# Patient Record
Sex: MALE | Race: WHITE | NOT HISPANIC OR LATINO
[De-identification: names, ages, dates, MRNs, and addresses within clinical notes are randomized per-mention and may not be internally consistent; named-entity substitution may affect disease eponyms.]

---

## 2018-06-20 ENCOUNTER — APPOINTMENT (OUTPATIENT)
Dept: ORTHOPEDIC SURGERY | Facility: CLINIC | Age: 73
End: 2018-06-20

## 2018-12-13 ENCOUNTER — APPOINTMENT (OUTPATIENT)
Dept: ORTHOPEDIC SURGERY | Facility: CLINIC | Age: 73
End: 2018-12-13
Payer: MEDICARE

## 2018-12-13 VITALS — DIASTOLIC BLOOD PRESSURE: 76 MMHG | WEIGHT: 170 LBS | HEART RATE: 72 BPM | SYSTOLIC BLOOD PRESSURE: 144 MMHG

## 2018-12-13 DIAGNOSIS — Z78.9 OTHER SPECIFIED HEALTH STATUS: ICD-10-CM

## 2018-12-13 DIAGNOSIS — Z87.39 PERSONAL HISTORY OF OTHER DISEASES OF THE MUSCULOSKELETAL SYSTEM AND CONNECTIVE TISSUE: ICD-10-CM

## 2018-12-13 PROCEDURE — 99204 OFFICE O/P NEW MOD 45 MIN: CPT

## 2018-12-13 PROCEDURE — 72170 X-RAY EXAM OF PELVIS: CPT

## 2018-12-13 PROCEDURE — 73562 X-RAY EXAM OF KNEE 3: CPT | Mod: 50

## 2018-12-18 ENCOUNTER — OTHER (OUTPATIENT)
Age: 73
End: 2018-12-18

## 2018-12-21 PROBLEM — Z78.9 DOES NOT USE ILLICIT DRUGS: Status: ACTIVE | Noted: 2018-12-13

## 2018-12-21 PROBLEM — Z78.9 RARELY CONSUMES ALCOHOL: Status: ACTIVE | Noted: 2018-12-13

## 2018-12-21 PROBLEM — Z87.39 HISTORY OF ARTHRITIS: Status: RESOLVED | Noted: 2018-12-13 | Resolved: 2018-12-21

## 2018-12-21 PROBLEM — Z78.9 EXERCISES OCCASIONALLY: Status: ACTIVE | Noted: 2018-12-13

## 2019-01-29 ENCOUNTER — OUTPATIENT (OUTPATIENT)
Dept: OUTPATIENT SERVICES | Facility: HOSPITAL | Age: 74
LOS: 1 days | End: 2019-01-29
Payer: MEDICARE

## 2019-01-29 ENCOUNTER — APPOINTMENT (OUTPATIENT)
Dept: MRI IMAGING | Facility: CLINIC | Age: 74
End: 2019-01-29
Payer: MEDICARE

## 2019-01-29 DIAGNOSIS — M17.0 BILATERAL PRIMARY OSTEOARTHRITIS OF KNEE: ICD-10-CM

## 2019-01-29 DIAGNOSIS — M25.562 PAIN IN LEFT KNEE: ICD-10-CM

## 2019-01-29 PROCEDURE — 73721 MRI JNT OF LWR EXTRE W/O DYE: CPT | Mod: 26,LT

## 2019-01-29 PROCEDURE — 73721 MRI JNT OF LWR EXTRE W/O DYE: CPT

## 2019-02-06 ENCOUNTER — APPOINTMENT (OUTPATIENT)
Dept: ORTHOPEDIC SURGERY | Facility: CLINIC | Age: 74
End: 2019-02-06

## 2019-04-09 ENCOUNTER — OUTPATIENT (OUTPATIENT)
Dept: OUTPATIENT SERVICES | Facility: HOSPITAL | Age: 74
LOS: 1 days | End: 2019-04-09
Payer: MEDICARE

## 2019-04-09 VITALS
TEMPERATURE: 97 F | HEART RATE: 72 BPM | SYSTOLIC BLOOD PRESSURE: 118 MMHG | HEIGHT: 66 IN | DIASTOLIC BLOOD PRESSURE: 80 MMHG | WEIGHT: 171.96 LBS | RESPIRATION RATE: 16 BRPM

## 2019-04-09 DIAGNOSIS — M19.90 UNSPECIFIED OSTEOARTHRITIS, UNSPECIFIED SITE: ICD-10-CM

## 2019-04-09 DIAGNOSIS — Z92.3 PERSONAL HISTORY OF IRRADIATION: Chronic | ICD-10-CM

## 2019-04-09 DIAGNOSIS — Z94.84 STEM CELLS TRANSPLANT STATUS: Chronic | ICD-10-CM

## 2019-04-09 DIAGNOSIS — M17.12 UNILATERAL PRIMARY OSTEOARTHRITIS, LEFT KNEE: ICD-10-CM

## 2019-04-09 DIAGNOSIS — Z98.49 CATARACT EXTRACTION STATUS, UNSPECIFIED EYE: Chronic | ICD-10-CM

## 2019-04-09 DIAGNOSIS — Z98.890 OTHER SPECIFIED POSTPROCEDURAL STATES: Chronic | ICD-10-CM

## 2019-04-09 LAB
ANION GAP SERPL CALC-SCNC: 11 MMO/L — SIGNIFICANT CHANGE UP (ref 7–14)
APPEARANCE UR: CLEAR — SIGNIFICANT CHANGE UP
BILIRUB UR-MCNC: NEGATIVE — SIGNIFICANT CHANGE UP
BLD GP AB SCN SERPL QL: NEGATIVE — SIGNIFICANT CHANGE UP
BLOOD UR QL VISUAL: NEGATIVE — SIGNIFICANT CHANGE UP
BUN SERPL-MCNC: 21 MG/DL — SIGNIFICANT CHANGE UP (ref 7–23)
CALCIUM SERPL-MCNC: 9.4 MG/DL — SIGNIFICANT CHANGE UP (ref 8.4–10.5)
CHLORIDE SERPL-SCNC: 106 MMOL/L — SIGNIFICANT CHANGE UP (ref 98–107)
CO2 SERPL-SCNC: 28 MMOL/L — SIGNIFICANT CHANGE UP (ref 22–31)
COLOR SPEC: SIGNIFICANT CHANGE UP
CREAT SERPL-MCNC: 1.3 MG/DL — SIGNIFICANT CHANGE UP (ref 0.5–1.3)
GLUCOSE SERPL-MCNC: 97 MG/DL — SIGNIFICANT CHANGE UP (ref 70–99)
GLUCOSE UR-MCNC: NEGATIVE — SIGNIFICANT CHANGE UP
HBA1C BLD-MCNC: 5.3 % — SIGNIFICANT CHANGE UP (ref 4–5.6)
HCT VFR BLD CALC: 45.9 % — SIGNIFICANT CHANGE UP (ref 39–50)
HGB BLD-MCNC: 15 G/DL — SIGNIFICANT CHANGE UP (ref 13–17)
KETONES UR-MCNC: NEGATIVE — SIGNIFICANT CHANGE UP
LEUKOCYTE ESTERASE UR-ACNC: NEGATIVE — SIGNIFICANT CHANGE UP
MCHC RBC-ENTMCNC: 28.5 PG — SIGNIFICANT CHANGE UP (ref 27–34)
MCHC RBC-ENTMCNC: 32.7 % — SIGNIFICANT CHANGE UP (ref 32–36)
MCV RBC AUTO: 87.1 FL — SIGNIFICANT CHANGE UP (ref 80–100)
NITRITE UR-MCNC: NEGATIVE — SIGNIFICANT CHANGE UP
NRBC # FLD: 0 K/UL — SIGNIFICANT CHANGE UP (ref 0–0)
PH UR: 6 — SIGNIFICANT CHANGE UP (ref 5–8)
PLATELET # BLD AUTO: 173 K/UL — SIGNIFICANT CHANGE UP (ref 150–400)
PMV BLD: 10 FL — SIGNIFICANT CHANGE UP (ref 7–13)
POTASSIUM SERPL-MCNC: 4.1 MMOL/L — SIGNIFICANT CHANGE UP (ref 3.5–5.3)
POTASSIUM SERPL-SCNC: 4.1 MMOL/L — SIGNIFICANT CHANGE UP (ref 3.5–5.3)
PROT UR-MCNC: NEGATIVE — SIGNIFICANT CHANGE UP
RBC # BLD: 5.27 M/UL — SIGNIFICANT CHANGE UP (ref 4.2–5.8)
RBC # FLD: 13 % — SIGNIFICANT CHANGE UP (ref 10.3–14.5)
RH IG SCN BLD-IMP: POSITIVE — SIGNIFICANT CHANGE UP
SODIUM SERPL-SCNC: 145 MMOL/L — SIGNIFICANT CHANGE UP (ref 135–145)
SP GR SPEC: 1.01 — SIGNIFICANT CHANGE UP (ref 1–1.04)
UROBILINOGEN FLD QL: NORMAL — SIGNIFICANT CHANGE UP
WBC # BLD: 4.99 K/UL — SIGNIFICANT CHANGE UP (ref 3.8–10.5)
WBC # FLD AUTO: 4.99 K/UL — SIGNIFICANT CHANGE UP (ref 3.8–10.5)

## 2019-04-09 PROCEDURE — 93010 ELECTROCARDIOGRAM REPORT: CPT

## 2019-04-09 RX ORDER — SODIUM CHLORIDE 9 MG/ML
3 INJECTION INTRAMUSCULAR; INTRAVENOUS; SUBCUTANEOUS EVERY 8 HOURS
Qty: 0 | Refills: 0 | Status: DISCONTINUED | OUTPATIENT
Start: 2019-04-29 | End: 2019-04-30

## 2019-04-09 NOTE — H&P PST ADULT - NEGATIVE CARDIOVASCULAR SYMPTOMS
no palpitations/no chest pain/no paroxysmal nocturnal dyspnea/no peripheral edema/no dyspnea on exertion

## 2019-04-09 NOTE — H&P PST ADULT - RS GEN PE MLT RESP DETAILS PC
airway patent/clear to auscultation bilaterally/breath sounds equal/good air movement/respirations non-labored/no chest wall tenderness

## 2019-04-09 NOTE — H&P PST ADULT - NEGATIVE NEUROLOGICAL SYMPTOMS
no loss of consciousness/no generalized seizures/no tremors/no loss of sensation/no vertigo/no headache/no confusion/no focal seizures/no hemiparesis/no syncope/no transient paralysis/no weakness/no paresthesias

## 2019-04-09 NOTE — H&P PST ADULT - NSICDXPROBLEM_GEN_ALL_CORE_FT
PROBLEM DIAGNOSES  Problem: OA (osteoarthritis)  Assessment and Plan: Scheduled for Left Total Knee Replacement on 4/29/2019  Preop instructions given, pt verbalized understanding   Chlorhexidine wash and GI prophylaxis provided   Pt was instructed by his surgeon to obtain medical clearance preop   He will see his PCP on 4/11  PST labs and EKG to be faxed to Dr. Sangeetha CORTEZ and Medical clearance requested in PST

## 2019-04-09 NOTE — H&P PST ADULT - NSICDXPASTSURGICALHX_GEN_ALL_CORE_FT
PAST SURGICAL HISTORY:  H/O autologous stem cell transplant     S/P cataract extraction bilateral    S/P radiation therapy

## 2019-04-09 NOTE — H&P PST ADULT - HISTORY OF PRESENT ILLNESS
75 y/o male presents to PST for preoperative evaluation with diagnosis of unilateral primary osteoarthritis, left knee. Pt reports increased left knee discomfort and difficulty walking down stairs. s/p autologous stem cell tx 3 years ago but no relief of symptoms.  Scheduled for Left Total Knee Replacement on 4/29/2019. Xray of left knee showed "bone on bone".

## 2019-04-10 PROBLEM — C61 MALIGNANT NEOPLASM OF PROSTATE: Chronic | Status: ACTIVE | Noted: 2019-04-09

## 2019-04-10 PROBLEM — M19.90 UNSPECIFIED OSTEOARTHRITIS, UNSPECIFIED SITE: Chronic | Status: ACTIVE | Noted: 2019-04-09

## 2019-04-10 LAB
BACTERIA UR CULT: SIGNIFICANT CHANGE UP
SPECIMEN SOURCE: SIGNIFICANT CHANGE UP
SPECIMEN SOURCE: SIGNIFICANT CHANGE UP

## 2019-04-11 LAB — BACTERIA NPH CULT: SIGNIFICANT CHANGE UP

## 2019-04-24 ENCOUNTER — OTHER (OUTPATIENT)
Age: 74
End: 2019-04-24

## 2019-04-24 DIAGNOSIS — M17.0 BILATERAL PRIMARY OSTEOARTHRITIS OF KNEE: ICD-10-CM

## 2019-04-26 NOTE — ASU PATIENT PROFILE, ADULT - PSH
H/O autologous stem cell transplant    H/O colonoscopy    S/P cataract extraction  bilateral  S/P radiation therapy

## 2019-04-28 ENCOUNTER — TRANSCRIPTION ENCOUNTER (OUTPATIENT)
Age: 74
End: 2019-04-28

## 2019-04-29 ENCOUNTER — APPOINTMENT (OUTPATIENT)
Dept: ORTHOPEDIC SURGERY | Facility: HOSPITAL | Age: 74
End: 2019-04-29

## 2019-04-29 ENCOUNTER — RESULT REVIEW (OUTPATIENT)
Age: 74
End: 2019-04-29

## 2019-04-29 ENCOUNTER — INPATIENT (INPATIENT)
Facility: HOSPITAL | Age: 74
LOS: 0 days | Discharge: HOME | End: 2019-04-30
Attending: ORTHOPAEDIC SURGERY | Admitting: ORTHOPAEDIC SURGERY
Payer: MEDICARE

## 2019-04-29 VITALS
HEIGHT: 66 IN | OXYGEN SATURATION: 100 % | TEMPERATURE: 98 F | WEIGHT: 171.08 LBS | HEART RATE: 69 BPM | SYSTOLIC BLOOD PRESSURE: 155 MMHG | DIASTOLIC BLOOD PRESSURE: 63 MMHG | RESPIRATION RATE: 16 BRPM

## 2019-04-29 DIAGNOSIS — Z98.890 OTHER SPECIFIED POSTPROCEDURAL STATES: Chronic | ICD-10-CM

## 2019-04-29 DIAGNOSIS — Z92.3 PERSONAL HISTORY OF IRRADIATION: Chronic | ICD-10-CM

## 2019-04-29 DIAGNOSIS — Z94.84 STEM CELLS TRANSPLANT STATUS: Chronic | ICD-10-CM

## 2019-04-29 DIAGNOSIS — M17.12 UNILATERAL PRIMARY OSTEOARTHRITIS, LEFT KNEE: ICD-10-CM

## 2019-04-29 DIAGNOSIS — Z98.49 CATARACT EXTRACTION STATUS, UNSPECIFIED EYE: Chronic | ICD-10-CM

## 2019-04-29 LAB
ANION GAP SERPL CALC-SCNC: 13 MMO/L — SIGNIFICANT CHANGE UP (ref 7–14)
BUN SERPL-MCNC: 21 MG/DL — SIGNIFICANT CHANGE UP (ref 7–23)
CALCIUM SERPL-MCNC: 9.1 MG/DL — SIGNIFICANT CHANGE UP (ref 8.4–10.5)
CHLORIDE SERPL-SCNC: 101 MMOL/L — SIGNIFICANT CHANGE UP (ref 98–107)
CO2 SERPL-SCNC: 24 MMOL/L — SIGNIFICANT CHANGE UP (ref 22–31)
CREAT SERPL-MCNC: 1.25 MG/DL — SIGNIFICANT CHANGE UP (ref 0.5–1.3)
GLUCOSE BLDC GLUCOMTR-MCNC: 98 MG/DL — SIGNIFICANT CHANGE UP (ref 70–99)
GLUCOSE SERPL-MCNC: 137 MG/DL — HIGH (ref 70–99)
HCT VFR BLD CALC: 44.5 % — SIGNIFICANT CHANGE UP (ref 39–50)
HGB BLD-MCNC: 15 G/DL — SIGNIFICANT CHANGE UP (ref 13–17)
MCHC RBC-ENTMCNC: 29.2 PG — SIGNIFICANT CHANGE UP (ref 27–34)
MCHC RBC-ENTMCNC: 33.7 % — SIGNIFICANT CHANGE UP (ref 32–36)
MCV RBC AUTO: 86.6 FL — SIGNIFICANT CHANGE UP (ref 80–100)
NRBC # FLD: 0 K/UL — SIGNIFICANT CHANGE UP (ref 0–0)
PLATELET # BLD AUTO: 153 K/UL — SIGNIFICANT CHANGE UP (ref 150–400)
PMV BLD: 10 FL — SIGNIFICANT CHANGE UP (ref 7–13)
POTASSIUM SERPL-MCNC: 4.5 MMOL/L — SIGNIFICANT CHANGE UP (ref 3.5–5.3)
POTASSIUM SERPL-SCNC: 4.5 MMOL/L — SIGNIFICANT CHANGE UP (ref 3.5–5.3)
RBC # BLD: 5.14 M/UL — SIGNIFICANT CHANGE UP (ref 4.2–5.8)
RBC # FLD: 13.1 % — SIGNIFICANT CHANGE UP (ref 10.3–14.5)
RH IG SCN BLD-IMP: POSITIVE — SIGNIFICANT CHANGE UP
SODIUM SERPL-SCNC: 138 MMOL/L — SIGNIFICANT CHANGE UP (ref 135–145)
WBC # BLD: 6.56 K/UL — SIGNIFICANT CHANGE UP (ref 3.8–10.5)
WBC # FLD AUTO: 6.56 K/UL — SIGNIFICANT CHANGE UP (ref 3.8–10.5)

## 2019-04-29 PROCEDURE — 88305 TISSUE EXAM BY PATHOLOGIST: CPT | Mod: 26

## 2019-04-29 PROCEDURE — 27447 TOTAL KNEE ARTHROPLASTY: CPT | Mod: LT

## 2019-04-29 PROCEDURE — 88311 DECALCIFY TISSUE: CPT | Mod: 26

## 2019-04-29 PROCEDURE — 73560 X-RAY EXAM OF KNEE 1 OR 2: CPT | Mod: 26,LT

## 2019-04-29 RX ORDER — ACETAMINOPHEN 500 MG
975 TABLET ORAL ONCE
Qty: 0 | Refills: 0 | Status: COMPLETED | OUTPATIENT
Start: 2019-04-29 | End: 2019-04-29

## 2019-04-29 RX ORDER — DEXAMETHASONE 0.5 MG/5ML
10 ELIXIR ORAL EVERY 12 HOURS
Qty: 0 | Refills: 0 | Status: COMPLETED | OUTPATIENT
Start: 2019-04-29 | End: 2019-04-30

## 2019-04-29 RX ORDER — TRAMADOL HYDROCHLORIDE 50 MG/1
50 TABLET ORAL ONCE
Qty: 0 | Refills: 0 | Status: DISCONTINUED | OUTPATIENT
Start: 2019-04-29 | End: 2019-04-29

## 2019-04-29 RX ORDER — ONDANSETRON 8 MG/1
4 TABLET, FILM COATED ORAL EVERY 6 HOURS
Qty: 0 | Refills: 0 | Status: DISCONTINUED | OUTPATIENT
Start: 2019-04-29 | End: 2019-04-30

## 2019-04-29 RX ORDER — PANTOPRAZOLE SODIUM 20 MG/1
40 TABLET, DELAYED RELEASE ORAL
Qty: 0 | Refills: 0 | Status: DISCONTINUED | OUTPATIENT
Start: 2019-04-29 | End: 2019-04-30

## 2019-04-29 RX ORDER — SODIUM CHLORIDE 9 MG/ML
1000 INJECTION, SOLUTION INTRAVENOUS ONCE
Qty: 0 | Refills: 0 | Status: COMPLETED | OUTPATIENT
Start: 2019-04-30 | End: 2019-04-30

## 2019-04-29 RX ORDER — SODIUM CHLORIDE 9 MG/ML
1000 INJECTION, SOLUTION INTRAVENOUS ONCE
Qty: 0 | Refills: 0 | Status: COMPLETED | OUTPATIENT
Start: 2019-04-29 | End: 2019-04-29

## 2019-04-29 RX ORDER — TRAMADOL HYDROCHLORIDE 50 MG/1
50 TABLET ORAL EVERY 8 HOURS
Qty: 0 | Refills: 0 | Status: DISCONTINUED | OUTPATIENT
Start: 2019-04-29 | End: 2019-04-30

## 2019-04-29 RX ORDER — SODIUM CHLORIDE 9 MG/ML
1000 INJECTION, SOLUTION INTRAVENOUS
Qty: 0 | Refills: 0 | Status: DISCONTINUED | OUTPATIENT
Start: 2019-04-29 | End: 2019-04-29

## 2019-04-29 RX ORDER — DOCUSATE SODIUM 100 MG
100 CAPSULE ORAL THREE TIMES A DAY
Qty: 0 | Refills: 0 | Status: DISCONTINUED | OUTPATIENT
Start: 2019-04-29 | End: 2019-04-30

## 2019-04-29 RX ORDER — ACETAMINOPHEN 500 MG
975 TABLET ORAL EVERY 8 HOURS
Qty: 0 | Refills: 0 | Status: DISCONTINUED | OUTPATIENT
Start: 2019-04-29 | End: 2019-04-30

## 2019-04-29 RX ORDER — FOLIC ACID/VIT B COMPLEX AND C 400 MCG
1 TABLET ORAL
Qty: 0 | Refills: 0 | COMMUNITY

## 2019-04-29 RX ORDER — HYDROMORPHONE HYDROCHLORIDE 2 MG/ML
0.5 INJECTION INTRAMUSCULAR; INTRAVENOUS; SUBCUTANEOUS ONCE
Qty: 0 | Refills: 0 | Status: DISCONTINUED | OUTPATIENT
Start: 2019-04-29 | End: 2019-04-30

## 2019-04-29 RX ORDER — OXYCODONE HYDROCHLORIDE 5 MG/1
5 TABLET ORAL EVERY 4 HOURS
Qty: 0 | Refills: 0 | Status: DISCONTINUED | OUTPATIENT
Start: 2019-04-29 | End: 2019-04-30

## 2019-04-29 RX ORDER — HYDROMORPHONE HYDROCHLORIDE 2 MG/ML
0.5 INJECTION INTRAMUSCULAR; INTRAVENOUS; SUBCUTANEOUS
Qty: 0 | Refills: 0 | Status: DISCONTINUED | OUTPATIENT
Start: 2019-04-29 | End: 2019-04-29

## 2019-04-29 RX ORDER — SODIUM CHLORIDE 9 MG/ML
1000 INJECTION, SOLUTION INTRAVENOUS
Qty: 0 | Refills: 0 | Status: DISCONTINUED | OUTPATIENT
Start: 2019-04-29 | End: 2019-04-30

## 2019-04-29 RX ORDER — ONDANSETRON 8 MG/1
4 TABLET, FILM COATED ORAL ONCE
Qty: 0 | Refills: 0 | Status: DISCONTINUED | OUTPATIENT
Start: 2019-04-29 | End: 2019-04-29

## 2019-04-29 RX ORDER — SENNA PLUS 8.6 MG/1
2 TABLET ORAL AT BEDTIME
Qty: 0 | Refills: 0 | Status: DISCONTINUED | OUTPATIENT
Start: 2019-04-29 | End: 2019-04-30

## 2019-04-29 RX ORDER — ASPIRIN/CALCIUM CARB/MAGNESIUM 324 MG
325 TABLET ORAL
Qty: 0 | Refills: 0 | Status: DISCONTINUED | OUTPATIENT
Start: 2019-04-29 | End: 2019-04-30

## 2019-04-29 RX ORDER — OXYCODONE HYDROCHLORIDE 5 MG/1
10 TABLET ORAL EVERY 4 HOURS
Qty: 0 | Refills: 0 | Status: DISCONTINUED | OUTPATIENT
Start: 2019-04-29 | End: 2019-04-30

## 2019-04-29 RX ORDER — VITAMIN E 100 UNIT
1 CAPSULE ORAL
Qty: 0 | Refills: 0 | COMMUNITY

## 2019-04-29 RX ORDER — PANTOPRAZOLE SODIUM 20 MG/1
40 TABLET, DELAYED RELEASE ORAL ONCE
Qty: 0 | Refills: 0 | Status: COMPLETED | OUTPATIENT
Start: 2019-04-29 | End: 2019-04-29

## 2019-04-29 RX ORDER — CEFAZOLIN SODIUM 1 G
2000 VIAL (EA) INJECTION EVERY 8 HOURS
Qty: 0 | Refills: 0 | Status: COMPLETED | OUTPATIENT
Start: 2019-04-29 | End: 2019-04-30

## 2019-04-29 RX ORDER — KETOROLAC TROMETHAMINE 30 MG/ML
15 SYRINGE (ML) INJECTION EVERY 6 HOURS
Qty: 0 | Refills: 0 | Status: DISCONTINUED | OUTPATIENT
Start: 2019-04-29 | End: 2019-04-30

## 2019-04-29 RX ORDER — MAGNESIUM HYDROXIDE 400 MG/1
30 TABLET, CHEWABLE ORAL DAILY
Qty: 0 | Refills: 0 | Status: DISCONTINUED | OUTPATIENT
Start: 2019-04-29 | End: 2019-04-30

## 2019-04-29 RX ORDER — ASCORBIC ACID 60 MG
1 TABLET,CHEWABLE ORAL
Qty: 0 | Refills: 0 | COMMUNITY

## 2019-04-29 RX ORDER — POLYETHYLENE GLYCOL 3350 17 G/17G
17 POWDER, FOR SOLUTION ORAL DAILY
Qty: 0 | Refills: 0 | Status: DISCONTINUED | OUTPATIENT
Start: 2019-04-29 | End: 2019-04-30

## 2019-04-29 RX ADMIN — SODIUM CHLORIDE 1000 MILLILITER(S): 9 INJECTION, SOLUTION INTRAVENOUS at 14:00

## 2019-04-29 RX ADMIN — Medication 75 MILLIGRAM(S): at 07:45

## 2019-04-29 RX ADMIN — TRAMADOL HYDROCHLORIDE 50 MILLIGRAM(S): 50 TABLET ORAL at 07:45

## 2019-04-29 RX ADMIN — PANTOPRAZOLE SODIUM 40 MILLIGRAM(S): 20 TABLET, DELAYED RELEASE ORAL at 07:44

## 2019-04-29 RX ADMIN — Medication 975 MILLIGRAM(S): at 21:40

## 2019-04-29 RX ADMIN — SODIUM CHLORIDE 1000 MILLILITER(S): 9 INJECTION, SOLUTION INTRAVENOUS at 18:11

## 2019-04-29 RX ADMIN — SODIUM CHLORIDE 3 MILLILITER(S): 9 INJECTION INTRAMUSCULAR; INTRAVENOUS; SUBCUTANEOUS at 21:32

## 2019-04-29 RX ADMIN — SODIUM CHLORIDE 150 MILLILITER(S): 9 INJECTION, SOLUTION INTRAVENOUS at 18:12

## 2019-04-29 RX ADMIN — Medication 150 MILLIGRAM(S): at 18:11

## 2019-04-29 RX ADMIN — Medication 100 MILLIGRAM(S): at 21:40

## 2019-04-29 RX ADMIN — Medication 975 MILLIGRAM(S): at 14:35

## 2019-04-29 RX ADMIN — Medication 325 MILLIGRAM(S): at 18:11

## 2019-04-29 RX ADMIN — Medication 100 MILLIGRAM(S): at 14:35

## 2019-04-29 RX ADMIN — SODIUM CHLORIDE 3 MILLILITER(S): 9 INJECTION INTRAMUSCULAR; INTRAVENOUS; SUBCUTANEOUS at 13:51

## 2019-04-29 RX ADMIN — TRAMADOL HYDROCHLORIDE 50 MILLIGRAM(S): 50 TABLET ORAL at 21:40

## 2019-04-29 RX ADMIN — TRAMADOL HYDROCHLORIDE 50 MILLIGRAM(S): 50 TABLET ORAL at 14:35

## 2019-04-29 RX ADMIN — Medication 100 MILLIGRAM(S): at 18:11

## 2019-04-29 RX ADMIN — SODIUM CHLORIDE 150 MILLILITER(S): 9 INJECTION, SOLUTION INTRAVENOUS at 14:00

## 2019-04-29 RX ADMIN — Medication 10 MILLIGRAM(S): at 21:40

## 2019-04-29 RX ADMIN — Medication 975 MILLIGRAM(S): at 07:44

## 2019-04-29 RX ADMIN — SODIUM CHLORIDE 30 MILLILITER(S): 9 INJECTION, SOLUTION INTRAVENOUS at 07:44

## 2019-04-29 NOTE — PHYSICAL THERAPY INITIAL EVALUATION ADULT - PERTINENT HX OF CURRENT PROBLEM, REHAB EVAL
73 y/o male presents to PST for preoperative evaluation with diagnosis of unilateral primary osteoarthritis, left knee. Pt reports increased left knee discomfort and difficulty walking down stairs. s/p autologous stem cell tx 3 years ago but no relief of symptoms.  Scheduled for Left Total Knee Replacement on 4/29/2019. Xray of left knee showed "bone on bone".

## 2019-04-29 NOTE — OCCUPATIONAL THERAPY INITIAL EVALUATION ADULT - GENERAL OBSERVATIONS, REHAB EVAL
Pt. received semisupine in bed. No acute distress. Patient agreed to evaluation from Occupational Therapist. +Clean dry intact dressing to Left Knee, +Left Knee Immobilizer, +Accordion Drain, +IV. Wife and family bedside.

## 2019-04-29 NOTE — PHYSICAL THERAPY INITIAL EVALUATION ADULT - ADDITIONAL COMMENTS
Pt reports that he lives in a private house with wife with . Prior to hospital admission pt was completely independent and used.    Pt left comfortable in bed, NAD, all lines intact, all precautions maintained, with call bell in reach, and RN aware of PT evaluation. Pt reports that he lives in an apartment with wife and family with ~3-4STE; (+)bilateral handrails; elevator inside. Prior to hospital admission pt was completely independent and used no assistive device with ambulation. Pt denies any recent falls.    Pt left comfortable in bed, NAD, all lines intact, all precautions maintained, with call bell in reach,family @bedside and RN aware of PT evaluation.

## 2019-04-29 NOTE — OCCUPATIONAL THERAPY INITIAL EVALUATION ADULT - PERTINENT HX OF CURRENT PROBLEM, REHAB EVAL
Pt is a 74 year old male who reports increased left knee discomfort and difficulty walking down stairs. Pt is s/p autologous stem cell tx 3 years ago but no relief of symptoms. Xray of left knee showed "bone on bone". Pt with diagnosis of unilateral primary osteoarthritis, left knee. Pt is now s/p Left Total Knee Replacement on 4/29/2019.

## 2019-04-29 NOTE — PROGRESS NOTE ADULT - SUBJECTIVE AND OBJECTIVE BOX
Ortho Post-Op    Patient was seen and examined at bedside. Denies CP/SOB/Dizziness/HA/N/V. Pain is controlled. Patient states block is still intact. Unable to wiggle his toes yet, sensation slowly returning.    Vital Signs Last 24 Hrs  T(C): 36.8 (29 Apr 2019 13:00), Max: 36.9 (29 Apr 2019 07:11)  T(F): 98.3 (29 Apr 2019 13:00), Max: 98.5 (29 Apr 2019 07:11)  HR: 77 (29 Apr 2019 13:30) (69 - 83)  BP: 105/56 (29 Apr 2019 13:30) (103/50 - 155/63)  BP(mean): 65 (29 Apr 2019 13:30) (60 - 72)  RR: 18 (29 Apr 2019 13:30) (10 - 23)  SpO2: 97% (29 Apr 2019 13:30) (94% - 100%)    GEN: NAD  LLE: Dressing C/D/I.  HV in place  Sensory and motor exam limited due to block being intact still. Extremity warm. Compartments are soft. DP 1+    Labs:                          15.0   6.56  )-----------( 153      ( 29 Apr 2019 12:40 )             44.5       04-29    138  |  101  |  21  ----------------------------<  137<H>  4.5   |  24  |  1.25    Ca    9.1      29 Apr 2019 12:40        A/P: Patient is a 74y y/o Male s/p left total knee arthroplasty, POD # 0    -Pain control/analgesia  -Inc spirometry  -Venodynes/foot pumps  -F/U AM Labs  -PT/OT/WBAT  -Antibiotic periop-ancef  -Anticoagulation-aspirin 325mg bid  -Continue to monitor. Notify ortho with any questions.

## 2019-04-29 NOTE — OCCUPATIONAL THERAPY INITIAL EVALUATION ADULT - MD ORDER
Occupational Therapy (OT) to evaluate and treat. Out of Bed to Chair. Per AVTAR Goff, pt is okay to participate in OT evaluation and perform activity as tolerated.

## 2019-04-29 NOTE — PHYSICAL THERAPY INITIAL EVALUATION ADULT - PATIENT PROFILE REVIEW, REHAB EVAL
ACTIVITY: OOB to Chair; spoke with RN prior to PT evaluation--> Pt OK for PT consult/OOB activity/yes yes/ACTIVITY: OOB to Chair; spoke with RN Tressa Goff  prior to PT evaluation--> Pt OK for PT consult/OOB activity

## 2019-04-29 NOTE — OCCUPATIONAL THERAPY INITIAL EVALUATION ADULT - LIVES WITH, PROFILE
Pt. reports he lives with his wife in a house with 4 steps to enter. Once inside, pt. reports bedroom and bathroom are located on the main level. Pt states there is an elevator available to reach 2nd floor if needed. Per pt., he has a shower stall in his bathroom on the main level.

## 2019-04-29 NOTE — PHYSICAL THERAPY INITIAL EVALUATION ADULT - CRITERIA FOR SKILLED THERAPEUTIC INTERVENTIONS
risk reduction/prevention/impairments found/rehab potential/functional limitations in following categories

## 2019-04-29 NOTE — PHYSICAL THERAPY INITIAL EVALUATION ADULT - RANGE OF MOTION EXAMINATION, REHAB EVAL
bilateral upper extremity ROM was WFL (within functional limits)/left knee 5-115 degrees active assisted/bilateral lower extremity ROM was WFL (within functional limits)

## 2019-04-29 NOTE — ASU PREOP CHECKLIST - SELECT TESTS ORDERED
90/Type and Cross/Type and Screen/EKG/POCT Blood Glucose/BMP/CBC 98/Type and Cross/Type and Screen/POCT Blood Glucose/EKG/BMP/CBC

## 2019-04-30 ENCOUNTER — TRANSCRIPTION ENCOUNTER (OUTPATIENT)
Age: 74
End: 2019-04-30

## 2019-04-30 VITALS
OXYGEN SATURATION: 97 % | HEART RATE: 69 BPM | TEMPERATURE: 98 F | DIASTOLIC BLOOD PRESSURE: 57 MMHG | RESPIRATION RATE: 17 BRPM | SYSTOLIC BLOOD PRESSURE: 106 MMHG

## 2019-04-30 DIAGNOSIS — M17.12 UNILATERAL PRIMARY OSTEOARTHRITIS, LEFT KNEE: ICD-10-CM

## 2019-04-30 DIAGNOSIS — N17.9 ACUTE KIDNEY FAILURE, UNSPECIFIED: ICD-10-CM

## 2019-04-30 DIAGNOSIS — C61 MALIGNANT NEOPLASM OF PROSTATE: ICD-10-CM

## 2019-04-30 LAB
ANION GAP SERPL CALC-SCNC: 12 MMO/L — SIGNIFICANT CHANGE UP (ref 7–14)
BUN SERPL-MCNC: 21 MG/DL — SIGNIFICANT CHANGE UP (ref 7–23)
CALCIUM SERPL-MCNC: 8.7 MG/DL — SIGNIFICANT CHANGE UP (ref 8.4–10.5)
CHLORIDE SERPL-SCNC: 107 MMOL/L — SIGNIFICANT CHANGE UP (ref 98–107)
CO2 SERPL-SCNC: 23 MMOL/L — SIGNIFICANT CHANGE UP (ref 22–31)
CREAT SERPL-MCNC: 1.31 MG/DL — HIGH (ref 0.5–1.3)
GLUCOSE SERPL-MCNC: 155 MG/DL — HIGH (ref 70–99)
HCT VFR BLD CALC: 37.9 % — LOW (ref 39–50)
HGB BLD-MCNC: 12.7 G/DL — LOW (ref 13–17)
MCHC RBC-ENTMCNC: 29.2 PG — SIGNIFICANT CHANGE UP (ref 27–34)
MCHC RBC-ENTMCNC: 33.5 % — SIGNIFICANT CHANGE UP (ref 32–36)
MCV RBC AUTO: 87.1 FL — SIGNIFICANT CHANGE UP (ref 80–100)
NRBC # FLD: 0 K/UL — SIGNIFICANT CHANGE UP (ref 0–0)
PLATELET # BLD AUTO: 153 K/UL — SIGNIFICANT CHANGE UP (ref 150–400)
PMV BLD: 10.1 FL — SIGNIFICANT CHANGE UP (ref 7–13)
POTASSIUM SERPL-MCNC: 4.6 MMOL/L — SIGNIFICANT CHANGE UP (ref 3.5–5.3)
POTASSIUM SERPL-SCNC: 4.6 MMOL/L — SIGNIFICANT CHANGE UP (ref 3.5–5.3)
RBC # BLD: 4.35 M/UL — SIGNIFICANT CHANGE UP (ref 4.2–5.8)
RBC # FLD: 13.2 % — SIGNIFICANT CHANGE UP (ref 10.3–14.5)
SODIUM SERPL-SCNC: 142 MMOL/L — SIGNIFICANT CHANGE UP (ref 135–145)
WBC # BLD: 15.5 K/UL — HIGH (ref 3.8–10.5)
WBC # FLD AUTO: 15.5 K/UL — HIGH (ref 3.8–10.5)

## 2019-04-30 PROCEDURE — 99223 1ST HOSP IP/OBS HIGH 75: CPT

## 2019-04-30 PROCEDURE — 99239 HOSP IP/OBS DSCHRG MGMT >30: CPT

## 2019-04-30 RX ORDER — PANTOPRAZOLE SODIUM 20 MG/1
1 TABLET, DELAYED RELEASE ORAL
Qty: 30 | Refills: 0 | OUTPATIENT
Start: 2019-04-30 | End: 2019-05-29

## 2019-04-30 RX ORDER — SENNA PLUS 8.6 MG/1
2 TABLET ORAL
Qty: 10 | Refills: 0 | OUTPATIENT
Start: 2019-04-30 | End: 2019-05-04

## 2019-04-30 RX ORDER — GABAPENTIN 400 MG/1
1 CAPSULE ORAL
Qty: 42 | Refills: 0 | OUTPATIENT
Start: 2019-04-30 | End: 2019-05-13

## 2019-04-30 RX ORDER — ASPIRIN/CALCIUM CARB/MAGNESIUM 324 MG
1 TABLET ORAL
Qty: 84 | Refills: 0 | OUTPATIENT
Start: 2019-04-30 | End: 2019-06-10

## 2019-04-30 RX ORDER — MELOXICAM 15 MG/1
1 TABLET ORAL
Qty: 30 | Refills: 0 | OUTPATIENT
Start: 2019-04-30 | End: 2019-05-29

## 2019-04-30 RX ORDER — TRAMADOL HYDROCHLORIDE 50 MG/1
1 TABLET ORAL
Qty: 21 | Refills: 0 | OUTPATIENT
Start: 2019-04-30 | End: 2019-05-06

## 2019-04-30 RX ORDER — DOCUSATE SODIUM 100 MG
1 CAPSULE ORAL
Qty: 0 | Refills: 0 | COMMUNITY
Start: 2019-04-30

## 2019-04-30 RX ADMIN — Medication 100 MILLIGRAM(S): at 01:04

## 2019-04-30 RX ADMIN — Medication 100 MILLIGRAM(S): at 14:12

## 2019-04-30 RX ADMIN — Medication 100 MILLIGRAM(S): at 05:57

## 2019-04-30 RX ADMIN — Medication 975 MILLIGRAM(S): at 05:56

## 2019-04-30 RX ADMIN — PANTOPRAZOLE SODIUM 40 MILLIGRAM(S): 20 TABLET, DELAYED RELEASE ORAL at 06:01

## 2019-04-30 RX ADMIN — Medication 975 MILLIGRAM(S): at 14:12

## 2019-04-30 RX ADMIN — Medication 10 MILLIGRAM(S): at 05:56

## 2019-04-30 RX ADMIN — SODIUM CHLORIDE 3 MILLILITER(S): 9 INJECTION INTRAMUSCULAR; INTRAVENOUS; SUBCUTANEOUS at 14:14

## 2019-04-30 RX ADMIN — TRAMADOL HYDROCHLORIDE 50 MILLIGRAM(S): 50 TABLET ORAL at 14:12

## 2019-04-30 RX ADMIN — POLYETHYLENE GLYCOL 3350 17 GRAM(S): 17 POWDER, FOR SOLUTION ORAL at 12:38

## 2019-04-30 RX ADMIN — Medication 75 MILLIGRAM(S): at 17:15

## 2019-04-30 RX ADMIN — Medication 325 MILLIGRAM(S): at 17:15

## 2019-04-30 RX ADMIN — SODIUM CHLORIDE 1000 MILLILITER(S): 9 INJECTION, SOLUTION INTRAVENOUS at 05:57

## 2019-04-30 RX ADMIN — TRAMADOL HYDROCHLORIDE 50 MILLIGRAM(S): 50 TABLET ORAL at 05:57

## 2019-04-30 RX ADMIN — Medication 325 MILLIGRAM(S): at 05:57

## 2019-04-30 RX ADMIN — SODIUM CHLORIDE 3 MILLILITER(S): 9 INJECTION INTRAMUSCULAR; INTRAVENOUS; SUBCUTANEOUS at 05:46

## 2019-04-30 RX ADMIN — Medication 75 MILLIGRAM(S): at 05:57

## 2019-04-30 NOTE — DISCHARGE NOTE PROVIDER - HOSPITAL COURSE
Patient is a 75 yo male s/p left knee arthroplasty on 4/29/2019. Patient tolerated the procedure well without any intraoperative complications. Patient states pain is controlled. Tolerated Physical Therapy well. As per Dr Worthy patient is stable and ready for discharge.     Please follow up with Dr Worthy in 1 week. Call office to make an appointment. Please follow up with your PMD for continuity of care and management. Please keep dressing clean, dry, intact. Any sutures/staples to be removed on post op day # 14.  Please take aspirin twice daily for DVT Prophylaxis. Weight bearing as tolerated. Call orthopaedics with any questions.

## 2019-04-30 NOTE — CONSULT NOTE ADULT - ASSESSMENT
75 y/o male w/ hx Prostate Ca 6 years ago s/p radiation, unilateral primary osteoarthritis of left knee p/w  increased left knee discomfort and difficulty walking down stairs not improved with autologous stem cell tx 3 years ago now s/p Left Total Knee Replacement on 4/29/2019.

## 2019-04-30 NOTE — DISCHARGE NOTE NURSING/CASE MANAGEMENT/SOCIAL WORK - NSDCDMETYPESERV_GEN_ALL_CORE_FT
The above company delivered a rolling walker and 3-in-one commode to the patient in hospital on 5/1/19 The above company delivered a rolling walker and 3-in-one commode to the patient in hospital on 4/30/19.

## 2019-04-30 NOTE — CONSULT NOTE ADULT - PROBLEM SELECTOR RECOMMENDATION 2
most likley due to dehydration  c/w lactated ringers  monitoring creatitine  encouraged 2 liters /day of free water intake.

## 2019-04-30 NOTE — DISCHARGE NOTE NURSING/CASE MANAGEMENT/SOCIAL WORK - NSSCTYPOFSERV_GEN_ALL_CORE
Please contact the home care agency at the above phone number if you have not heard from them by approximately 1pm on the day after your hospital discharge.

## 2019-04-30 NOTE — CONSULT NOTE ADULT - PROBLEM SELECTOR RECOMMENDATION 9
s/p Left TKR on 4/29  management as per ortho  pain controlled with Tylenol/Oxy IR prn  encouraged incentive spirometry and PT  ASA bid for DVT ppx  d/c planning as per primary team

## 2019-04-30 NOTE — DISCHARGE NOTE NURSING/CASE MANAGEMENT/SOCIAL WORK - NSDCPECAREGIVERED_GEN_ALL_CORE
knee replacement, total knee discharge instruction sheet, force, exercise worksheet, sex positions after total joint replacement, managing pain at home, know your medication side effects

## 2019-04-30 NOTE — CONSULT NOTE ADULT - SUBJECTIVE AND OBJECTIVE BOX
Patient is a 74y old  Male who presents with a chief complaint of elective total knee arthroplasty (30 Apr 2019 06:11)      HPI:  73 y/o male w/ hx Prostate Ca 6 years ago s/p radiation, unilateral primary osteoarthritis of left knee p/w  increased left knee discomfort and difficulty walking down stairs. s/p autologous stem cell tx 3 years ago but no relief of symptoms. Now s/p Left Total Knee Replacement on 4/29/2019.           Pain Symptoms if applicable:             	                         none	   mild         moderate         severe  Pain:	                            0	    1-3	     4-6	         7-10  Location:	  Modifying factors:	  Associated symptoms:	    Function: [ ] Independent  [ ] Assistance  [ ] Total care  [ ] Non-ambulatory    Allergies    No Known Allergies    Intolerances        HOME MEDICATIONS: [x ] Reviewed  vitamin A: 1 tab(s) orally once a day  Vitamin B: 1 tab(s) orally once a day  Vitamin C: 1 tab(s) orally once a day  Vitamin D: 1 tab(s) orally once a day  vitamin E oral capsule: 1 cap(s) orally once a day - last dose 4/21/19    MEDICATIONS  (STANDING):  acetaminophen   Tablet .. 975 milliGRAM(s) Oral every 8 hours  aspirin enteric coated 325 milliGRAM(s) Oral two times a day  docusate sodium 100 milliGRAM(s) Oral three times a day  lactated ringers. 1000 milliLiter(s) (150 mL/Hr) IV Continuous <Continuous>  pantoprazole    Tablet 40 milliGRAM(s) Oral before breakfast  polyethylene glycol 3350 17 Gram(s) Oral daily  pregabalin 75 milliGRAM(s) Oral every 12 hours  sodium chloride 0.9% lock flush 3 milliLiter(s) IV Push every 8 hours  traMADol 50 milliGRAM(s) Oral every 8 hours    MEDICATIONS  (PRN):  aluminum hydroxide/magnesium hydroxide/simethicone Suspension 30 milliLiter(s) Oral four times a day PRN Indigestion  HYDROmorphone  Injectable 0.5 milliGRAM(s) IV Push once PRN breakthrough pain  magnesium hydroxide Suspension 30 milliLiter(s) Oral daily PRN Constipation  ondansetron Injectable 4 milliGRAM(s) IV Push every 6 hours PRN Nausea and/or Vomiting  oxyCODONE    IR 5 milliGRAM(s) Oral every 4 hours PRN Moderate Pain (4 - 6)  oxyCODONE    IR 10 milliGRAM(s) Oral every 4 hours PRN Severe Pain (7 - 10)  senna 2 Tablet(s) Oral at bedtime PRN Constipation      PAST MEDICAL & SURGICAL HISTORY:  Prostate cancer: 6 years ago  OA (osteoarthritis): left knee  H/O colonoscopy  S/P radiation therapy  S/P cataract extraction: bilateral  H/O autologous stem cell transplant  [x ] Reviewed     SOCIAL HISTORY:  Residence: [ ] [facility]  [ ] SNF  [ x] Community  [ x] Substance abuse: none  [ x] Tobacco: none  [x ] Alcohol use: none    FAMILY HISTORY:  No pertinent family history in first degree relatives      REVIEW OF SYSTEMS:    CONSTITUTIONAL: No fever, weight loss, or fatigue  EYES: No eye pain, visual disturbances, or discharge  ENMT:  No difficulty hearing, tinnitus, vertigo; No sinus or throat pain  NECK: No pain or stiffness  BREASTS: No pain, masses, or nipple discharge  RESPIRATORY: No cough, wheezing, chills or hemoptysis; No shortness of breath  CARDIOVASCULAR: No chest pain, palpitations, dizziness, or leg swelling  GASTROINTESTINAL: mild dysphagia. No abdominal or epigastric pain. No nausea, vomiting, or hematemesis; No diarrhea or constipation. No melena or hematochezia.  GENITOURINARY: No dysuria, frequency, hematuria, or incontinence  NEUROLOGICAL: No headaches, memory loss, loss of strength, numbness, or tremors  SKIN: No itching, burning, rashes, or lesions   LYMPH NODES: No enlarged glands  ENDOCRINE: No heat or cold intolerance; No hair loss  MUSCULOSKELETAL: left knee pain  PSYCHIATRIC: No depression, anxiety, mood swings, or difficulty sleeping  HEME/LYMPH: No easy bruising, or bleeding gums  ALLERGY AND IMMUNOLOGIC: No hives or eczema    [ x ] All other ROS negative  [  ] Unable to obtain due to poor mental status    Vital Signs Last 24 Hrs  T(C): 36.7 (30 Apr 2019 09:22), Max: 36.8 (29 Apr 2019 13:00)  T(F): 98 (30 Apr 2019 09:22), Max: 98.3 (29 Apr 2019 13:00)  HR: 68 (30 Apr 2019 09:22) (68 - 95)  BP: 120/75 (30 Apr 2019 09:22) (100/60 - 126/76)  BP(mean): 65 (29 Apr 2019 16:00) (60 - 72)  RR: 17 (30 Apr 2019 09:22) (14 - 23)  SpO2: 100% (30 Apr 2019 09:22) (94% - 100%)    PHYSICAL EXAM:    GENERAL: NAD, well-groomed, well-developed  HEAD:  Atraumatic, Normocephalic  EYES: EOMI, PERRLA, conjunctiva and sclera clear  ENMT: Moist mucous membranes  NECK: Supple, No JVD  RESPIRATORY: Clear to auscultation bilaterally; No rales, rhonchi, wheezing, or rubs  CARDIOVASCULAR: Regular rate and rhythm; No murmurs, rubs, or gallops  GASTROINTESTINAL: Soft, Nontender, Nondistended; Bowel sounds present  GENITOURINARY: Not examined  EXTREMITIES:  2+ Peripheral Pulses, No clubbing, cyanosis, or edema  NERVOUS SYSTEM:  Alert & Oriented X3; Moving all 4 extremities; No gross sensory deficits  HEME/LYMPH: No lymphadenopathy noted  SKIN: No rashes or lesions; Incisions C/D/I    LABS:                        12.7   15.50 )-----------( 153      ( 30 Apr 2019 05:48 )             37.9     04-30    142  |  107  |  21  ----------------------------<  155<H>  4.6   |  23  |  1.31<H>    Ca    8.7      30 Apr 2019 05:48          CAPILLARY BLOOD GLUCOSE      POCT Blood Glucose.: 98 mg/dL (29 Apr 2019 07:12)      RADIOLOGY & ADDITIONAL STUDIES:    EKG:   Personally Reviewed:  [ ] YES     Imaging: < from: Xray Knee 1 or 2 Views, Left (04.29.19 @ 11:29) >  IMPRESSION:  Unconstrained left total knee prosthesis implanted.    Intact and aligned hardware and no periprosthetic fractures.    Postoperative soft tissue changes.    Surgical drainoverlies operative site.    Correlate with intraoperative findings.    < end of copied text >    Personally Reviewed:  [ x] YES               Consultant(s) notes reviewed:    Care Discussed with Consultant(s)/Other Providers: Ortho Patient is a 74y old  Male who presents with a chief complaint of elective total knee arthroplasty (30 Apr 2019 06:11)      HPI:  75 y/o male w/ hx Prostate Ca 6 years ago s/p radiation, unilateral primary osteoarthritis of left knee p/w  increased left knee discomfort and difficulty walking down stairs not improved with autologous stem cell tx 3 years ago now s/p Left Total Knee Replacement on 4/29/2019. Patient has no new complaints. Denies cp, SOB, abdominal pain, N/V/D            Pain Symptoms if applicable:             	                         none	   mild         moderate         severe  Pain: 4	                            0	    1-3	     4-6	         7-10  Location: left knee	  Modifying factors: movement	  Associated symptoms:	    Function: [ x] Independent  [ ] Assistance  [ ] Total care  [ ] Non-ambulatory    Allergies    No Known Allergies    Intolerances        HOME MEDICATIONS: [x ] Reviewed  vitamin A: 1 tab(s) orally once a day  Vitamin B: 1 tab(s) orally once a day  Vitamin C: 1 tab(s) orally once a day  Vitamin D: 1 tab(s) orally once a day  vitamin E oral capsule: 1 cap(s) orally once a day - last dose 4/21/19    MEDICATIONS  (STANDING):  acetaminophen   Tablet .. 975 milliGRAM(s) Oral every 8 hours  aspirin enteric coated 325 milliGRAM(s) Oral two times a day  docusate sodium 100 milliGRAM(s) Oral three times a day  lactated ringers. 1000 milliLiter(s) (150 mL/Hr) IV Continuous <Continuous>  pantoprazole    Tablet 40 milliGRAM(s) Oral before breakfast  polyethylene glycol 3350 17 Gram(s) Oral daily  pregabalin 75 milliGRAM(s) Oral every 12 hours  sodium chloride 0.9% lock flush 3 milliLiter(s) IV Push every 8 hours  traMADol 50 milliGRAM(s) Oral every 8 hours    MEDICATIONS  (PRN):  aluminum hydroxide/magnesium hydroxide/simethicone Suspension 30 milliLiter(s) Oral four times a day PRN Indigestion  HYDROmorphone  Injectable 0.5 milliGRAM(s) IV Push once PRN breakthrough pain  magnesium hydroxide Suspension 30 milliLiter(s) Oral daily PRN Constipation  ondansetron Injectable 4 milliGRAM(s) IV Push every 6 hours PRN Nausea and/or Vomiting  oxyCODONE    IR 5 milliGRAM(s) Oral every 4 hours PRN Moderate Pain (4 - 6)  oxyCODONE    IR 10 milliGRAM(s) Oral every 4 hours PRN Severe Pain (7 - 10)  senna 2 Tablet(s) Oral at bedtime PRN Constipation      PAST MEDICAL & SURGICAL HISTORY:  Prostate cancer: 6 years ago  OA (osteoarthritis): left knee  H/O colonoscopy  S/P radiation therapy  S/P cataract extraction: bilateral  H/O autologous stem cell transplant  [x ] Reviewed     SOCIAL HISTORY:  Residence: [ ] [facility]  [ ] SNF  [ x] Community  [ x] Substance abuse: none  [ x] Tobacco: none  [x ] Alcohol use: none    FAMILY HISTORY:  No pertinent family history in first degree relatives      REVIEW OF SYSTEMS:    CONSTITUTIONAL: No fever, weight loss, or fatigue  EYES: No eye pain, visual disturbances, or discharge  ENMT:  No difficulty hearing, tinnitus, vertigo; No sinus or throat pain  NECK: No pain or stiffness  BREASTS: No pain, masses, or nipple discharge  RESPIRATORY: No cough, wheezing, chills or hemoptysis; No shortness of breath  CARDIOVASCULAR: No chest pain, palpitations, dizziness, or leg swelling  GASTROINTESTINAL: mild dysphagia. No abdominal or epigastric pain. No nausea, vomiting, or hematemesis; No diarrhea or constipation. No melena or hematochezia.  GENITOURINARY: No dysuria, frequency, hematuria, or incontinence  NEUROLOGICAL: No headaches, memory loss, loss of strength, numbness, or tremors  SKIN: No itching, burning, rashes, or lesions   LYMPH NODES: No enlarged glands  ENDOCRINE: No heat or cold intolerance; No hair loss  MUSCULOSKELETAL: left knee pain  PSYCHIATRIC: No depression, anxiety, mood swings, or difficulty sleeping  HEME/LYMPH: No easy bruising, or bleeding gums  ALLERGY AND IMMUNOLOGIC: No hives or eczema    [ x ] All other ROS negative  [  ] Unable to obtain due to poor mental status    Vital Signs Last 24 Hrs  T(C): 36.7 (30 Apr 2019 09:22), Max: 36.8 (29 Apr 2019 13:00)  T(F): 98 (30 Apr 2019 09:22), Max: 98.3 (29 Apr 2019 13:00)  HR: 68 (30 Apr 2019 09:22) (68 - 95)  BP: 120/75 (30 Apr 2019 09:22) (100/60 - 126/76)  BP(mean): 65 (29 Apr 2019 16:00) (60 - 72)  RR: 17 (30 Apr 2019 09:22) (14 - 23)  SpO2: 100% (30 Apr 2019 09:22) (94% - 100%)    PHYSICAL EXAM:    GENERAL: NAD, well-groomed, well-developed  HEAD:  Atraumatic, Normocephalic  EYES: EOMI, PERRLA, conjunctiva and sclera clear  ENMT: Moist mucous membranes  NECK: Supple, No JVD  RESPIRATORY: Clear to auscultation bilaterally; No rales, rhonchi, wheezing, or rubs  CARDIOVASCULAR: Regular rate and rhythm; No murmurs, rubs, or gallops  GASTROINTESTINAL: Soft, Nontender, Nondistended; Bowel sounds present  GENITOURINARY: Not examined  EXTREMITIES:  Left knee bandage. 2+ Peripheral Pulses, No clubbing, cyanosis, or edema  NERVOUS SYSTEM:  Alert & Oriented X3; Moving all 4 extremities; No gross sensory deficits  HEME/LYMPH: No lymphadenopathy noted  SKIN: No rashes or lesions; Incisions C/D/I    LABS:                        12.7   15.50 )-----------( 153      ( 30 Apr 2019 05:48 )             37.9     04-30    142  |  107  |  21  ----------------------------<  155<H>  4.6   |  23  |  1.31<H>    Ca    8.7      30 Apr 2019 05:48          CAPILLARY BLOOD GLUCOSE      POCT Blood Glucose.: 98 mg/dL (29 Apr 2019 07:12)      RADIOLOGY & ADDITIONAL STUDIES:    EKG:   Personally Reviewed:  [ ] YES     Imaging: < from: Xray Knee 1 or 2 Views, Left (04.29.19 @ 11:29) >  IMPRESSION:  Unconstrained left total knee prosthesis implanted.    Intact and aligned hardware and no periprosthetic fractures.    Postoperative soft tissue changes.    Surgical drainoverlies operative site.    Correlate with intraoperative findings.    < end of copied text >    Personally Reviewed:  [ x] YES               Consultant(s) notes reviewed:    Care Discussed with Consultant(s)/Other Providers: Ortho

## 2019-04-30 NOTE — DISCHARGE NOTE PROVIDER - CARE PROVIDER_API CALL
Favian Worthy)  Orthopaedic Surgery  [address], [address]  [place] [address]  Phone: [phone]  Fax: [phone]  Follow Up Time:

## 2019-04-30 NOTE — DISCHARGE NOTE NURSING/CASE MANAGEMENT/SOCIAL WORK - NSDCPNINST_GEN_ALL_CORE
Call MD for any c/o numbness, tingling, swelling to knee, pain not relieved after medicated for pain, fever >101.00.  You have a postop appointment with Dr. Worthy on 5/16/19 @9:30 am @[address] [place] [address]. . If unable to attend appointment, please call office to reschedule appointment. Leave dressing in place until appointment with  doctor. Take over the counter stool softener to prevent constipation caused from pain medications.

## 2019-04-30 NOTE — DISCHARGE NOTE PROVIDER - NSDCCPCAREPLAN_GEN_ALL_CORE_FT
PRINCIPAL DISCHARGE DIAGNOSIS  Diagnosis: OA (osteoarthritis)  Assessment and Plan of Treatment: Patient is a 75 yo male s/p left knee arthroplasty on 4/29/2019. Patient tolerated the procedure well without any intraoperative complications. Patient states pain is controlled. Tolerated Physical Therapy well. As per Dr Worthy patient is stable and ready for discharge.   Please follow up with Dr Worthy in 1 week. Call office to make an appointment. Please follow up with your PMD for continuity of care and management. Please keep dressing clean, dry, intact. Any sutures/staples to be removed on post op day # 14.  Please take aspirin twice daily for DVT Prophylaxis. Weight bearing as tolerated. Call orthopaedics with any questions.

## 2019-04-30 NOTE — DISCHARGE NOTE NURSING/CASE MANAGEMENT/SOCIAL WORK - NSDCDPATPORTLINK_GEN_ALL_CORE
You can access the [facility] Portal, offered by [facility], by registering with the following website: [email or web address]

## 2019-04-30 NOTE — DISCHARGE NOTE NURSING/CASE MANAGEMENT/SOCIAL WORK - NSDPDISTO_GEN_ALL_CORE
Home with home care/Left knee with ace wrap dressing c/d/i.  Toes warm and mobile.  +NVS  Tolerated po and fluids and a return appointment.

## 2019-04-30 NOTE — DISCHARGE NOTE NURSING/CASE MANAGEMENT/SOCIAL WORK - NSSCNAMETXT_GEN_ALL_CORE
[facility] - Nurse to visit the day after hospital discharge; physical therapist to follow.

## 2019-04-30 NOTE — PROGRESS NOTE ADULT - SUBJECTIVE AND OBJECTIVE BOX
Patient is seen and examined. Denies CP/SOB/DIzziness/N/V/D/HA. Pain is controlled.     Vital Signs Last 24 Hrs  T(C): 36.5 (30 Apr 2019 05:43), Max: 36.9 (29 Apr 2019 07:11)  T(F): 97.7 (30 Apr 2019 05:43), Max: 98.5 (29 Apr 2019 07:11)  HR: 95 (30 Apr 2019 05:43) (69 - 95)  BP: 126/61 (30 Apr 2019 05:43) (100/60 - 155/63)  BP(mean): 65 (29 Apr 2019 16:00) (60 - 72)  RR: 16 (30 Apr 2019 05:43) (10 - 23)  SpO2: 96% (30 Apr 2019 05:43) (94% - 100%)    Gen: NAD    LLE: Dressing C/D/I.   Motor intact 5/5 LLE. Sensation is grossly intact in the LLE. Compartments are soft, extremities are warm. DP 1+ BL LE.    Labs:                        15.0   6.56  )-----------( 153      ( 29 Apr 2019 12:40 )             44.5       04-29    138  |  101  |  21  ----------------------------<  137<H>  4.5   |  24  |  1.25    Ca    9.1      29 Apr 2019 12:40        A/P: Patient is a 74y Male s/p L total knee arthroplasty, POD # 1    - Pain control / Analgesia  - Anticoagulation  -PT/OT  -WBAT  -OOB  -Inc Spirometry  -Foot Pumps  -F/U AM Labs  -DC today once cleared by physical therapy   -Notify Ortho with any questions

## 2019-05-09 ENCOUNTER — APPOINTMENT (OUTPATIENT)
Dept: ORTHOPEDIC SURGERY | Facility: CLINIC | Age: 74
End: 2019-05-09
Payer: MEDICARE

## 2019-05-09 DIAGNOSIS — M79.89 OTHER SPECIFIED SOFT TISSUE DISORDERS: ICD-10-CM

## 2019-05-09 PROCEDURE — 73562 X-RAY EXAM OF KNEE 3: CPT | Mod: RT

## 2019-05-09 PROCEDURE — 99024 POSTOP FOLLOW-UP VISIT: CPT

## 2019-05-09 RX ORDER — GABAPENTIN 100 MG/1
100 CAPSULE ORAL 3 TIMES DAILY
Qty: 180 | Refills: 0 | Status: ACTIVE | COMMUNITY
Start: 2019-05-09 | End: 1900-01-01

## 2019-05-09 RX ORDER — OXYCODONE AND ACETAMINOPHEN 5; 325 MG/1; MG/1
5-325 TABLET ORAL
Qty: 60 | Refills: 0 | Status: ACTIVE | COMMUNITY
Start: 2019-05-09 | End: 1900-01-01

## 2019-05-09 RX ORDER — CYCLOBENZAPRINE HYDROCHLORIDE 5 MG/1
5 TABLET, FILM COATED ORAL 3 TIMES DAILY
Qty: 90 | Refills: 0 | Status: ACTIVE | COMMUNITY
Start: 2019-05-09 | End: 1900-01-01

## 2019-05-09 RX ORDER — TRAMADOL HYDROCHLORIDE 50 MG/1
50 TABLET, COATED ORAL EVERY 8 HOURS
Qty: 180 | Refills: 0 | Status: ACTIVE | COMMUNITY
Start: 2019-05-09 | End: 1900-01-01

## 2019-05-11 LAB — SURGICAL PATHOLOGY STUDY: SIGNIFICANT CHANGE UP

## 2019-05-15 NOTE — CONSULT LETTER
[Dear  ___] : Dear  [unfilled], [I had the pleasure of evaluating your patient, [unfilled].] : I had the pleasure of evaluating your patient, [unfilled]. [Please see my note below.] : Please see my note below. [FreeTextEntry3] : Favian Worthy MD\par \par ______________________________________________\par [facility]: Hip/Knee Arthroplasty\par [address], [address], [place] [address]\par (t) [phone]\par (f) [phone]\par  [Sincerely,] : Sincerely, [FreeTextEntry2] : JENNIFER MOORE\par

## 2019-05-15 NOTE — HISTORY OF PRESENT ILLNESS
[de-identified] : JONATHAN BAKER is  s/p right total knee replacement, 1.5 weeks post operatively.  he has been participating in home physical therapy, as well as a home exercise program daily. he  is taking tramadol oxycodone gabapentin and meloxicam (on and off) for pain and pain level is not well controlled. he  is taking ecotrin for DVT ppx. He returns to the office today for an early first post operative appointment, as he is in extreme pain, and unable to function well. \par  [de-identified] : s/p right total knee replacement 4/29/2019 [de-identified] : RIGHT Knee: Walks with a right stiff knee gait. The mepilex was removed today and steri strips have been applied to the incision. There is a well-healed scar surgery with no significant swelling, redness or tenderness. There is a valgus alignment of 5°, no effusion, active straight leg raise of 60° and knee range of motion of -5-75° with good stability alignment and quadriceps grade 4 power. There is extensive pain with range of motion, and hypersensitivity along the skin. \par  [de-identified] : The patient was informed of the findings and recommended conservative management in the form of a home exercise program, activity modifications, stationary bicycling, and ambulation with a cane.  A prescription for a course of physical therapy was provided.  Prescriptions for gabapentin (200mg TID), meloxicam, oxycodone 5-10mg QID, Tramadol 100mg TID, as well as Flexeril have been provided for pain control, to obtain a better control of the pain.   A long discussion was had with the patient advising them to wean from use of the narcotic medication as tolerated.  he will take aspirin for DVT prophylaxis for another four weeks.  I have emphasized ice and elevation of the leg, with compression. A prescription for compression stockings was provided for the patient. The risks, benefits, and side effects were discussed.  Follow up appointment was recommended in two weeks.\par  [de-identified] : RIGHT Knee: Walks with a right stiff knee gait. The aquacel was removed today and steri strips have been applied to the incision. There is a well-healed scar surgery with no significant swelling, redness or tenderness. There is a valgus alignment of 5°, no effusion, active straight leg raise of 60° and knee range of motion of 0-95° with good stability alignment and quadriceps grade 4 power.\par

## 2019-05-16 ENCOUNTER — APPOINTMENT (OUTPATIENT)
Dept: ORTHOPEDIC SURGERY | Facility: CLINIC | Age: 74
End: 2019-05-16
Payer: MEDICARE

## 2019-05-16 VITALS
HEIGHT: 69 IN | HEART RATE: 82 BPM | DIASTOLIC BLOOD PRESSURE: 71 MMHG | WEIGHT: 170 LBS | SYSTOLIC BLOOD PRESSURE: 117 MMHG | BODY MASS INDEX: 25.18 KG/M2

## 2019-05-16 PROCEDURE — 99024 POSTOP FOLLOW-UP VISIT: CPT

## 2019-05-22 ENCOUNTER — MOBILE ON CALL (OUTPATIENT)
Age: 74
End: 2019-05-22

## 2019-05-24 ENCOUNTER — APPOINTMENT (OUTPATIENT)
Dept: ORTHOPEDIC SURGERY | Facility: CLINIC | Age: 74
End: 2019-05-24

## 2019-05-28 RX ORDER — CYCLOBENZAPRINE HYDROCHLORIDE 5 MG/1
5 TABLET, FILM COATED ORAL 3 TIMES DAILY
Qty: 90 | Refills: 0 | Status: ACTIVE | COMMUNITY
Start: 2019-05-16

## 2019-05-28 NOTE — CONSULT LETTER
[Dear  ___] : Dear  [unfilled], [I had the pleasure of evaluating your patient, [unfilled].] : I had the pleasure of evaluating your patient, [unfilled]. [Please see my note below.] : Please see my note below. [Sincerely,] : Sincerely, [FreeTextEntry2] : JENNIFER MOORE\par  [FreeTextEntry3] : Favian Worthy MD\par \par ______________________________________________\par [facility]: Hip/Knee Arthroplasty\par [address], [address], [place] [address]\par (t) [phone]\par (f) [phone]\par

## 2019-05-28 NOTE — HISTORY OF PRESENT ILLNESS
[Clean/Dry/Intact] : clean, dry and intact [Healed] : healed [Neuro Intact] : an unremarkable neurological exam [Vascular Intact] : ~T peripheral vascular exam normal [Negative Phil's] : maneuvers demonstrated a negative Phil's sign [No Sign of Infection] : is showing no signs of infection [Slow Progress] : is progressing slowly [Adequate Pain Control] : has adequate pain control [Chills] : no chills [Fever] : no fever [Erythema] : not erythematous [Discharge] : absent of discharge [Swelling] : not swollen [Dehiscence] : not dehisced [de-identified] : JONATHAN BAKER is  s/p right total knee replacement. He has been participating in home physical therapy, as well as a home exercise program daily. He is taking tramadol oxycodone gabapentin 200mg TID and meloxicam for pain and pain level is not well controlled. He is taking ecotrin for DVT ppx. Patient complains of burning pain of the knee, as well as dry itchy skin about the lower leg. He has not been putting any Vaseline or cocoa butter on the skin. \par  [de-identified] : s/p right total knee replacement 4/29/2019 [de-identified] : RIGHT Knee: Walks with a right stiff knee gait.  There is a well-healed scar surgery with no significant swelling, redness or tenderness. There is a valgus alignment of 5°, no effusion, active straight leg raise of 60° and knee range of motion of 0-80° with good stability alignment and quadriceps grade 4 power. There is extensive pain with range of motion, and hypersensitivity along the skin. \par  [de-identified] : AP, Lateral, and Sunrise Radiographs of the Right knee taken last week reveal a well fixed and aligned Right total knee replacement with no evidence of mechanical failure or periprosthetic fracture.  [de-identified] : The patient was informed of the findings and recommended conservative management in the form of a home exercise program, activity modifications, stationary bicycling, and ambulation with a cane.  A prescription for a course of physical therapy was provided.  Prescriptions for Flexeril have been provided for pain control.  A long discussion was had with the patient advising them to wean from use of the narcotic medication as tolerated.  He will take aspirin for DVT prophylaxis for another four weeks.  I have emphasized ice and elevation of the leg, with compression. A prescription for compression stockings was provided for the patient. The risks, benefits, and side effects were discussed.  Follow up appointment was recommended in two weeks.\par

## 2019-05-30 ENCOUNTER — APPOINTMENT (OUTPATIENT)
Dept: ORTHOPEDIC SURGERY | Facility: CLINIC | Age: 74
End: 2019-05-30
Payer: MEDICARE

## 2019-05-30 VITALS — DIASTOLIC BLOOD PRESSURE: 79 MMHG | HEART RATE: 85 BPM | SYSTOLIC BLOOD PRESSURE: 145 MMHG

## 2019-05-30 PROCEDURE — 99024 POSTOP FOLLOW-UP VISIT: CPT

## 2019-05-30 RX ORDER — CYCLOBENZAPRINE HYDROCHLORIDE 5 MG/1
5 TABLET, FILM COATED ORAL 3 TIMES DAILY
Qty: 90 | Refills: 0 | Status: ACTIVE | COMMUNITY
Start: 2019-05-30 | End: 1900-01-01

## 2019-06-06 ENCOUNTER — APPOINTMENT (OUTPATIENT)
Age: 74
End: 2019-06-06

## 2019-06-07 ENCOUNTER — APPOINTMENT (OUTPATIENT)
Age: 74
End: 2019-06-07
Payer: MEDICARE

## 2019-06-07 VITALS — DIASTOLIC BLOOD PRESSURE: 70 MMHG | SYSTOLIC BLOOD PRESSURE: 120 MMHG | RESPIRATION RATE: 16 BRPM | HEART RATE: 76 BPM

## 2019-06-07 DIAGNOSIS — Z98.890 OTHER SPECIFIED POSTPROCEDURAL STATES: ICD-10-CM

## 2019-06-07 DIAGNOSIS — K59.00 CONSTIPATION, UNSPECIFIED: ICD-10-CM

## 2019-06-07 PROCEDURE — 99024 POSTOP FOLLOW-UP VISIT: CPT

## 2019-06-07 RX ORDER — PANTOPRAZOLE 40 MG/1
40 TABLET, DELAYED RELEASE ORAL
Qty: 30 | Refills: 0 | Status: ACTIVE | COMMUNITY
Start: 2019-04-30

## 2019-06-07 RX ORDER — CEPHALEXIN 500 MG/1
500 CAPSULE ORAL 4 TIMES DAILY
Qty: 28 | Refills: 0 | Status: DISCONTINUED | COMMUNITY
Start: 2019-05-14 | End: 2019-06-07

## 2019-06-07 RX ORDER — MUPIROCIN 20 MG/G
2 OINTMENT TOPICAL
Qty: 1 | Refills: 0 | Status: DISCONTINUED | COMMUNITY
Start: 2019-04-19 | End: 2019-06-07

## 2019-06-07 RX ORDER — AMOXICILLIN AND CLAVULANATE POTASSIUM 875; 125 MG/1; MG/1
875-125 TABLET, COATED ORAL
Qty: 20 | Refills: 0 | Status: DISCONTINUED | COMMUNITY
Start: 2018-12-12

## 2019-06-07 RX ORDER — METHYLPREDNISOLONE 4 MG/1
4 TABLET ORAL
Qty: 21 | Refills: 0 | Status: DISCONTINUED | COMMUNITY
Start: 2019-03-11

## 2019-06-07 RX ORDER — MELOXICAM 15 MG/1
15 TABLET ORAL
Qty: 30 | Refills: 0 | Status: DISCONTINUED | COMMUNITY
Start: 2019-05-09 | End: 2019-06-07

## 2019-06-07 RX ORDER — GABAPENTIN 100 MG/1
100 CAPSULE ORAL 3 TIMES DAILY
Qty: 90 | Refills: 0 | Status: DISCONTINUED | COMMUNITY
Start: 2019-05-23 | End: 2019-06-07

## 2019-06-07 NOTE — PHYSICAL EXAM
[No Acute Distress] : no acute distress [PERRL] : pupils equal round and reactive to light [No JVD] : no jugular venous distention [Supple] : supple [No Respiratory Distress] : no respiratory distress  [Clear to Auscultation] : lungs were clear to auscultation bilaterally [No Accessory Muscle Use] : no accessory muscle use [Normal Rate] : normal rate  [Regular Rhythm] : with a regular rhythm [Normal S1, S2] : normal S1 and S2 [No Murmur] : no murmur heard [Pedal Pulses Present] : the pedal pulses are present [Soft] : abdomen soft [Non Tender] : non-tender [Normal Bowel Sounds] : normal bowel sounds [Distended] : distended [No Focal Deficits] : no focal deficits [Alert and Oriented x3] : oriented to person, place, and time

## 2019-06-07 NOTE — HISTORY OF PRESENT ILLNESS
[FreeTextEntry1] : TCM [facility]  Follow up "Burning in leg and nausea" Pt is 74 s/p LTKA on 4/29 with . Seen in home for follow up transitional care services for [facility] program. C/o nausea x 2 days and burning in left leg at night. No changes in PO intake, diet. Denies vomiting, diarrhea, chills, fever, abd pain/tenderness. Last BM reported > 3 days ago. He reports taking only mobic,  mg, gabapentin 100 mg tid, percocet at night for severe pain and burning with some relief.  Is going to outpatient therapy tiw as ordered.  Abdomen is mildly distended, no tenderness/pain on palpation, + BS, + flatus on exam.  [de-identified] : Pt is a 74 y.o male with hx of hypothyroidism, prostate ca, OA left knee s/p elective LTKR on 4/29/2019

## 2019-06-13 ENCOUNTER — APPOINTMENT (OUTPATIENT)
Dept: ORTHOPEDIC SURGERY | Facility: CLINIC | Age: 74
End: 2019-06-13
Payer: MEDICARE

## 2019-06-13 VITALS — HEART RATE: 83 BPM | SYSTOLIC BLOOD PRESSURE: 128 MMHG | DIASTOLIC BLOOD PRESSURE: 75 MMHG

## 2019-06-13 DIAGNOSIS — Z96.651 PRESENCE OF RIGHT ARTIFICIAL KNEE JOINT: ICD-10-CM

## 2019-06-13 PROCEDURE — 99024 POSTOP FOLLOW-UP VISIT: CPT

## 2019-06-13 RX ORDER — MELOXICAM 15 MG/1
15 TABLET ORAL DAILY
Qty: 30 | Refills: 0 | Status: ACTIVE | COMMUNITY
Start: 2019-06-13 | End: 1900-01-01

## 2019-06-13 RX ORDER — CYCLOBENZAPRINE HYDROCHLORIDE 5 MG/1
5 TABLET, FILM COATED ORAL 3 TIMES DAILY
Qty: 90 | Refills: 0 | Status: ACTIVE | COMMUNITY
Start: 2019-06-13 | End: 1900-01-01

## 2019-07-11 ENCOUNTER — APPOINTMENT (OUTPATIENT)
Dept: ORTHOPEDIC SURGERY | Facility: CLINIC | Age: 74
End: 2019-07-11
Payer: MEDICARE

## 2019-07-11 VITALS
BODY MASS INDEX: 25.18 KG/M2 | WEIGHT: 170 LBS | HEIGHT: 69 IN | SYSTOLIC BLOOD PRESSURE: 149 MMHG | HEART RATE: 76 BPM | DIASTOLIC BLOOD PRESSURE: 81 MMHG

## 2019-07-11 PROCEDURE — 99024 POSTOP FOLLOW-UP VISIT: CPT

## 2019-07-11 NOTE — HISTORY OF PRESENT ILLNESS
[Clean/Dry/Intact] : clean, dry and intact [Healed] : healed [Neuro Intact] : an unremarkable neurological exam [Vascular Intact] : ~T peripheral vascular exam normal [Negative Phil's] : maneuvers demonstrated a negative Phil's sign [Slow Progress] : is progressing slowly [No Sign of Infection] : is showing no signs of infection [Adequate Pain Control] : has adequate pain control [Chills] : no chills [Fever] : no fever [Erythema] : not erythematous [Discharge] : absent of discharge [Swelling] : not swollen [de-identified] : s/p Left total knee replacement 4/29/2019 [Dehiscence] : not dehisced [de-identified] : LEFT Knee: Walks with a Left stiff knee gait.  There is a well-healed scar surgery with no significant swelling, redness or tenderness. There is a valgus alignment of 5°, no effusion, active straight leg raise of 60° and knee range of motion of 5-80° with good stability alignment and quadriceps grade 4 power. \par  [de-identified] : JONATHAN BAKER is doing well s/p left total knee replacement. He has been participating in outpatient therapy, as well as a home exercise program daily. He is taking tramadol oxycodone and gabapentin 200mg TID for pain and pain level is not well controlled. HE is doing better compared to two weeks ago but still has pain. He is taking ecotrin for DVT ppx. Patient is follow up for dry cracked skin, as well as swelling. He has been wearing the compression stockings.  \par  [de-identified] : AP, Lateral, and Sunrise Radiographs of the Left knee taken last week reveal a well fixed and aligned Left total knee replacement with no evidence of mechanical failure or periprosthetic fracture.  [de-identified] : The patient was informed of the findings and recommended conservative management in the form of a home exercise program, activity modifications, stationary bicycling, and ambulation with a cane.  A prescription for a course of physical therapy was provided.  Prescriptions for Flexeril have been provided for a muscle relaxer.  A long discussion was had with the patient advising them to wean from use of the narcotic medication as tolerated.  He will take aspirin for DVT prophylaxis for another two weeks.  I have emphasized ice and elevation of the leg, with compression. The risks, benefits, and side effects were discussed.  Follow up appointment was recommended in 6 weeks.

## 2019-07-11 NOTE — CONSULT LETTER
[Dear  ___] : Dear  [unfilled], [I had the pleasure of evaluating your patient, [unfilled].] : I had the pleasure of evaluating your patient, [unfilled]. [Please see my note below.] : Please see my note below. [Sincerely,] : Sincerely, [FreeTextEntry3] : Favian Worthy MD\par \par ______________________________________________\par [facility]: Hip/Knee Arthroplasty\par [address], [address], [place] [address]\par (t) [phone]\par (f) [phone]\par  [FreeTextEntry2] : JENNIFER MOORE\par

## 2019-07-11 NOTE — HISTORY OF PRESENT ILLNESS
[Clean/Dry/Intact] : clean, dry and intact [Healed] : healed [Vascular Intact] : ~T peripheral vascular exam normal [Neuro Intact] : an unremarkable neurological exam [Negative Phil's] : maneuvers demonstrated a negative Phil's sign [Slow Progress] : is progressing slowly [Adequate Pain Control] : has adequate pain control [No Sign of Infection] : is showing no signs of infection [Chills] : no chills [Fever] : no fever [Erythema] : not erythematous [Discharge] : absent of discharge [Swelling] : not swollen [de-identified] : s/p Left total knee replacement 4/29/2019 [Dehiscence] : not dehisced [de-identified] : JONATHAN BAKER is doing well s/p Left total knee replacement. He has been participating in outpatient therapy, as well as a home exercise program daily. He is taking Mobic, tramadol, oxycodone, and gabapentin 200mg TID for pain and pain level is not very well controlled. He states he is improving slowly. He is taking ecotrin for DVT ppx. \par  [de-identified] : LEFT Knee: Walks with a left stiff knee gait.  There is a well-healed scar surgery with no significant swelling, redness or tenderness. There is a valgus alignment of 5°, no effusion, active straight leg raise of 60° and knee range of motion of 3-90° with good stability alignment and quadriceps grade 4 power. \par  [de-identified] : AP, Lateral, and Sunrise Radiographs of the Left knee taken last visit reveal a well fixed and aligned Left total knee replacement with no evidence of mechanical failure or periprosthetic fracture.  [de-identified] : The patient was informed of the findings and recommended conservative management in the form of a home exercise program, activity modifications, stationary bicycling, and ambulation with a cane.  A prescription for a course of physical therapy was provided with emphasis on hamstring stretching.  A long discussion was had with the patient advising them to wean from use of the narcotic medication as tolerated.  He will take aspirin for DVT prophylaxis for another two weeks.  I have emphasized ice and elevation of the leg, with compression. The risks, benefits, and side effects were discussed.  Follow up appointment was recommended in 4 weeks.

## 2019-07-12 NOTE — HISTORY OF PRESENT ILLNESS
[Clean/Dry/Intact] : clean, dry and intact [Healed] : healed [Neuro Intact] : an unremarkable neurological exam [Vascular Intact] : ~T peripheral vascular exam normal [Negative Phil's] : maneuvers demonstrated a negative Phil's sign [No Sign of Infection] : is showing no signs of infection [Adequate Pain Control] : has adequate pain control [Doing Well] : is doing well [Chills] : no chills [Fever] : no fever [Erythema] : not erythematous [Discharge] : absent of discharge [Swelling] : not swollen [Dehiscence] : not dehisced [de-identified] : s/p left total knee replacement 4/29/2019 [de-identified] : LEFT Knee: Walks with a LEFt stiff knee gait.  There is a well-healed scar surgery with no significant swelling, redness or tenderness. There is a valgus alignment of 5°, no effusion, active straight leg raise of 60° and knee range of motion of 3-95° with good stability alignment and quadriceps grade 4+ power. \par  [de-identified] : AP, Lateral, and Sunrise Radiographs of the Left knee taken last visit reveal a well fixed and aligned Left total knee replacement with no evidence of mechanical failure or periprosthetic fracture.  [de-identified] : JONATHAN BAKER is doing well s/p left total knee replacement. He has been participating in outpatient therapy, as well as a home exercise program daily. He is taking only gabapentin 200mg at night for pain and pain level is well controlled. He states he is improving slowly. He completed taking ecotrin for DVT ppx. \par  [de-identified] : The patient was informed of the findings and recommended conservative management in the form of a home exercise program, activity modifications, stationary bicycling, and normal ambulation.  A prescription for a course of physical therapy was provided with emphasis on hamstring stretching. He no longer requires aspirin for DVT prophylaxis for another two weeks. The risks, benefits, and side effects were discussed. Follow up appointment was recommended in 1 month with [facility].

## 2019-07-12 NOTE — END OF VISIT
[FreeTextEntry3] : I have read the above documentation and agree with the treatment and plan. \par Favian Worthy MD\par \par ______________________________________________\par [facility]: Hip/Knee Arthroplasty\par [address], [address], [place] [address]\par (t) [phone]\par (f) [phone]\par

## 2019-07-12 NOTE — CONSULT LETTER
[Dear  ___] : Dear  [unfilled], [I had the pleasure of evaluating your patient, [unfilled].] : I had the pleasure of evaluating your patient, [unfilled]. [Please see my note below.] : Please see my note below. [Sincerely,] : Sincerely, [FreeTextEntry3] : Favian Worthy MD\par \par ______________________________________________\par [facility]: Hip/Knee Arthroplasty\par [address], [address], [place] [address]\par (t) [phone]\par (f) [phone]\par  [FreeTextEntry2] : JENNIFER MOORE\par

## 2019-07-28 ENCOUNTER — FORM ENCOUNTER (OUTPATIENT)
Age: 74
End: 2019-07-28

## 2019-12-03 NOTE — H&P PST ADULT - RESPIRATORY RATE (BREATHS/MIN)
O-Z Flap Text: The defect edges were debeveled with a #15 scalpel blade.  Given the location of the defect, shape of the defect and the proximity to free margins an O-Z flap was deemed most appropriate.  Using a sterile surgical marker, an appropriate transposition flap was drawn incorporating the defect and placing the expected incisions within the relaxed skin tension lines where possible. The area thus outlined was incised deep to adipose tissue with a #15 scalpel blade.  The skin margins were undermined to an appropriate distance in all directions utilizing iris scissors. 16

## 2020-05-20 ENCOUNTER — APPOINTMENT (OUTPATIENT)
Dept: ORTHOPEDIC SURGERY | Facility: CLINIC | Age: 75
End: 2020-05-20
Payer: MEDICARE

## 2020-05-20 VITALS — SYSTOLIC BLOOD PRESSURE: 159 MMHG | HEART RATE: 71 BPM | DIASTOLIC BLOOD PRESSURE: 58 MMHG

## 2020-05-20 VITALS — TEMPERATURE: 97.1 F

## 2020-05-20 DIAGNOSIS — R26.81 UNSTEADINESS ON FEET: ICD-10-CM

## 2020-05-20 DIAGNOSIS — M54.5 LOW BACK PAIN: ICD-10-CM

## 2020-05-20 DIAGNOSIS — Z96.652 PRESENCE OF LEFT ARTIFICIAL KNEE JOINT: ICD-10-CM

## 2020-05-20 DIAGNOSIS — M47.816 SPONDYLOSIS W/OUT MYELOPATHY OR RADICULOPATHY, LUMBAR REGION: ICD-10-CM

## 2020-05-20 PROCEDURE — 72100 X-RAY EXAM L-S SPINE 2/3 VWS: CPT

## 2020-05-20 PROCEDURE — 73562 X-RAY EXAM OF KNEE 3: CPT | Mod: LT

## 2020-05-20 PROCEDURE — 99214 OFFICE O/P EST MOD 30 MIN: CPT

## 2020-05-20 RX ORDER — GABAPENTIN 100 MG/1
100 CAPSULE ORAL 3 TIMES DAILY
Qty: 90 | Refills: 0 | Status: ACTIVE | COMMUNITY
Start: 2020-05-20 | End: 1900-01-01

## 2020-05-20 RX ORDER — DICLOFENAC SODIUM 75 MG/1
75 TABLET, DELAYED RELEASE ORAL
Qty: 1 | Refills: 1 | Status: ACTIVE | COMMUNITY
Start: 2020-05-20 | End: 1900-01-01

## 2020-05-20 NOTE — PHYSICAL EXAM
[Antalgic] : antalgic [LE] : Sensory: Intact in bilateral lower extremities [DP] : dorsalis pedis 2+ and symmetric bilaterally [Knee] : patellar 2+ and symmetric bilaterally [Ankle] : ankle 2+ and symmetric bilaterally [PT] : posterior tibial 2+ and symmetric bilaterally [de-identified] : On general examination the patient is adequately groomed and nourished. The vital parameters are as recorded. \par There is no lymphedema or diffuse swelling, no varicose veins, no skin warmth/erythema/scars/swelling, no ulcers and no palpable lymph nodes or masses in both lower extremities. Bilateral pedal pulses are well palpable.\par Upper Extremity:\par Both right and left upper extremities are unremarkable in terms of skin rash, lesions, pigmentation, redness, tenderness, swelling, joint instability, abnormal deformity or crepitus. The overall range of motion, sensation, motor tone and strength testing are normal.\par \par Left Knee: Walks with an unsteady gait. There is a well-healed scar surgery with no significant swelling, redness or tenderness. There is a valgus alignment of 5°, no effusion, active straight leg raise of 60° and knee range of motion of 0-115° with good stability alignment and quadriceps grade 4 power. \par \par Hip Exam:\par The gait and station is normal \par The patient has equal leg lengths and no pelvic tilt. Jose Alejandro/Reggie test is 7 inches on the right and 7 inches on the left. Active SLR is 60 degrees on the right and 60 degrees on the left. Both hips demonstrate no scars and the skin has no signs of inflammation or tenderness. \par Both Hips have a normal range of motion of flexion to 100 degrees, abduction 40 degrees, adduction 20 degrees, external rotation 40 degrees, internal rotation 20 degrees with symmetrical motion in flexion and extension. There is no flexion contracture, deformity or instability. Labral impingement tests are negative.\par Both hips flexor, abductor and extensor power is normal. \par \par Spine Exam:\par There is mild curvature of the spine with loss of normal lumbar lordosis. The skin is devoid of erythema, scars, pigmentation or rashes. There is mild lumbar spasm and tenderness especially at L4-L5 or L5-S1. \par The range of motion of the lumbar spine is limited by pain and spasm. Forward flexion is 80% normal, extension catch positive, lateral flexion and rotation 80% normal. There is no lumbar spine imbalance or instability detected.\par Bilateral passive SLR is right 40 degrees, left 40 degrees in supine and sitting positions. Lasegue's Test is negative.\par \par Neurology:\par The patient is alert and oriented in person, place and time. The mood is calm and affect is normal.\par Testing for coordination including sensation, motor tone and power and deep tendon reflexes in both lower extremities is normal. \par Romberg Test is positive for instability.  [de-identified] : AP, Lateral, and Sunrise Radiographs of the Left knee taken today reveal a well fixed and aligned left total knee replacement with no evidence of mechanical failure or periprosthetic fracture. \par AP and Lateral radiographs of the lumbar spine reveal multilevel DJD

## 2020-05-20 NOTE — DISCUSSION/SUMMARY
[de-identified] : Stable Status Post Left TKR, with residual nerve pain, with recent exacerbation. Gait Instability, Lumbar DJD. \par The patient was informed of the findings and recommended conservative management in the form of a home exercise program, activity modifications, stationary bicycling, swimming and weight loss program. A trial of Glucosamine and Chondroiten Sulphate was recommended.\par A prescription for a course of physical therapy was provided.\par A prescription for non-steroidal anti-inflammatory medication Diclofenac was provided and the risks, benefits and side effects were discussed.\par A prescription for Gabapentin was provided for nerve pain. \par In terms of his gait instability, patient has been informed to follow up with his neurologist.  \par Follow-up appointment was recommended annually from TKR.

## 2020-05-20 NOTE — HISTORY OF PRESENT ILLNESS
[3] : an average pain level of 3/10 [de-identified] : Mr. JONATHAN BAKER is a 75 year old male status post left total knee replacement 4/29/2019, presenting with some residual pain that was recently exacerbated. Patient states he is not exactly sure what happened but he fell out of bed a few weeks ago and may have twisted the left knee. Patient states the knee has not bee 100% better since surgery. He notices a lot of nerve/shooting pain at night to the medial and lateral aspect. He finished PT and is continuing with home exercise. He takes nothing for pain at this time. He presents today for follow up. OSIEL.

## 2020-05-20 NOTE — REVIEW OF SYSTEMS
[Joint Pain] : joint pain [Arthralgia] : arthralgia [Negative] : Heme/Lymph [Joint Stiffness] : no joint stiffness [Joint Swelling] : no joint swelling

## 2020-05-20 NOTE — CONSULT LETTER
[Dear  ___] : Dear  [unfilled], [Consult Letter:] : I had the pleasure of evaluating your patient, [unfilled]. [Consult Closing:] : Thank you very much for allowing me to participate in the care of this patient.  If you have any questions, please do not hesitate to contact me. [Sincerely,] : Sincerely, [FreeTextEntry2] : JENNIFER MOORE  [FreeTextEntry1] : Stable Status Post Left TKR, with residual nerve pain, with recent exacerbation. Gait Instability, Lumbar DJD. \par The patient was informed of the findings and recommended conservative management in the form of a home exercise program, activity modifications, stationary bicycling, swimming and weight loss program. A trial of Glucosamine and Chondroiten Sulphate was recommended.\par A prescription for a course of physical therapy was provided.\par A prescription for non-steroidal anti-inflammatory medication Diclofenac was provided and the risks, benefits and side effects were discussed.\par A prescription for Gabapentin was provided for nerve pain. \par In terms of his gait instability, patient has been informed to follow up with his neurologist.  \par Follow-up appointment was recommended annually from TKR.  [FreeTextEntry3] : Favian Worthy MD\par \par ______________________________________________\par [facility]: Hip/Knee Arthroplasty\par [address], [address], [place] [address]\par (t) [phone]\par (f) [phone]

## 2021-08-16 NOTE — PHYSICAL THERAPY INITIAL EVALUATION ADULT - GENERAL OBSERVATIONS, REHAB EVAL
Pt encountered in semisupine position on stretcher outside T909, no distress, AxOx4, with +IV, left knee wrapped in ace bandage dry/intact, left knee immobilizer Pt encountered in semisupine position, no distress, AxOx4, with +IV, left knee wrapped in ace bandage dry/intact, left knee immobilizer, and family @bedside. Skin normal color for race, warm, dry and intact. No evidence of rash.

## 2022-06-14 NOTE — DISCHARGE NOTE NURSING/CASE MANAGEMENT/SOCIAL WORK - NSDCPNDISPN_GEN_ALL_CORE
Working HTN gap report. Able to speak with pt and obtain a controlled BP. Remote reading entered.   
Side effects of pain management treatment/Safe use, storage and disposal of opioids when prescribed/Education provided on the pain management plan of care

## 2022-07-16 NOTE — CONSULT LETTER
[Dear  ___] : Dear  [unfilled], [Please see my note below.] : Please see my note below. [I had the pleasure of evaluating your patient, [unfilled].] : I had the pleasure of evaluating your patient, [unfilled]. [Sincerely,] : Sincerely, [FreeTextEntry2] : JENNIFER MOORE\par  [FreeTextEntry3] : Favian Worthy MD\par \par ______________________________________________\par [facility]: Hip/Knee Arthroplasty\par [address], [address], [place] [address]\par (t) [phone]\par (f) [phone]\par  Entertainment/Family support/Outdoor activity

## 2023-09-05 ENCOUNTER — APPOINTMENT (OUTPATIENT)
Dept: ORTHOPEDIC SURGERY | Facility: CLINIC | Age: 78
End: 2023-09-05
Payer: MEDICARE

## 2023-09-05 VITALS — WEIGHT: 170 LBS | RESPIRATION RATE: 16 BRPM | BODY MASS INDEX: 25.18 KG/M2 | HEIGHT: 69 IN

## 2023-09-05 DIAGNOSIS — M25.531 PAIN IN RIGHT WRIST: ICD-10-CM

## 2023-09-05 PROCEDURE — 99214 OFFICE O/P EST MOD 30 MIN: CPT

## 2023-09-05 PROCEDURE — 73110 X-RAY EXAM OF WRIST: CPT | Mod: 50

## 2023-09-05 NOTE — PHYSICAL EXAM
[de-identified] : On exam he has tenderness over the midcarpal and radiocarpal joint on the wrist.  He has wrist extension of 45 degrees and wrist flexion of 10 degrees.  Nontender over the ulnar aspect of the wrist [de-identified] : PA lateral oblique x-rays and clenched fist views demonstrate a slack wrist bilaterally much worse on the right with radial scaphoid and capitolunate arthritis

## 2023-09-05 NOTE — ASSESSMENT
[FreeTextEntry1] : The patient has a right symptomatic slac wrist.  Interesting that he was pain-free several months ago but now painful.  Cortisone injection did not help from a colleague.  Discussed options including observation, a repeat injection or surgery.  I would favor a scaphoid excision, for bone fusion, radial styloidectomy and PIN neurectomy.  Risks benefits and alternatives of each discussed.  With surgery he would lose motion compared to her normal wrist but he lacks significant motion so it is possible he actually gains motion.  Other risks of surgery include infection nonunion stiffness nerve injury etc.

## 2023-09-05 NOTE — HISTORY OF PRESENT ILLNESS
[Right] : right hand dominant [FreeTextEntry1] : Patient presents with right dorsal and radial wrist pain for the past 6 weeks.  He denies any injury.  Patient had a steroid wrist injection by Dr. Rutledge last month with no improvement.  He also had 1 session of occupational therapy and now continues home exercises occasionally.  He also wears a soft splint when needed.

## 2023-09-05 NOTE — CONSULT LETTER
[Dear  ___] : Dear  [unfilled], [Consult Letter:] : I had the pleasure of evaluating your patient, [unfilled]. [Please see my note below.] : Please see my note below. [Consult Closing:] : Thank you very much for allowing me to participate in the care of this patient.  If you have any questions, please do not hesitate to contact me. [Sincerely,] : Sincerely, [FreeTextEntry3] : Eder Parra MD Co-Director [facility]
